# Patient Record
(demographics unavailable — no encounter records)

---

## 2025-01-18 NOTE — HISTORY OF PRESENT ILLNESS
[FreeTextEntry1] : itchy rash on hands for years. Minimal response to previous treatments. [de-identified] : Father and grandparents are patients. Used clobetasol ointment and tacrolimus in past with very little change. Washes and disinfects hands often at work. 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

## 2025-01-18 NOTE — ASSESSMENT
[FreeTextEntry1] : Alert, oriented, well, pleasant.  Thick fissured plaques fingers hands toes feet. No itching or rash elesewhere.  Dyshidrotic dermatitis Dry skin care discussed at length including reduce showers, colder water, Dove Sensitive Skin bar soap, and moisturize daily or more often with cream moisturizers. Try Cetaphil, Eucerin, Aveeno, Cerave. Try vanicream, Theraplex barrier balm during daytime. Moisturize after every hand wash or disinfection. Written instructions.  start Claritin daily. 2 months plus. may take Benadryl 1 or 2 at bedtime for itch. Side effects discussed with patient.  start halobetasol ointment twice per day, end of workday and at bedtime with cotton gloves.  f/u 6 weeks. Intralesional and systemic treatments mentioned. No previous history.

## 2025-03-01 NOTE — ASSESSMENT
[FreeTextEntry1] : Alert, oriented, well, pleasant.  decreased erythema and inflammation. Still dry and scaly. Thin fissure palm. Feet almost resolved. Dyshidrotic dermatitis significant improvement 6 weeks. Continue clobetasol ointment once or twice per day just to itchy or red areas of feet and hands. Side effects discussed with patient.  Continue Claritin daily for 2 months plus. Moisturize daily at work ("every 20 minutes")  f/u prn.

## 2025-03-01 NOTE — HISTORY OF PRESENT ILLNESS
[FreeTextEntry1] : f/u hand and foot eczema. "A little better." [de-identified] : Compliant. Denies side effects.

## 2025-07-07 NOTE — ASSESSMENT
[FreeTextEntry1] : # HM f/u AV labs Due for Pap smear, should see GYN  # Anxiety Stable on therapy, not on meds f/u TFTs  # Elevated BP reading, obesity Discussed diet and exercise in order to lose weight. Increase raw fruits and vegetables, decrease processed foods, fatty foods, sugars. Recommended patient eliminate soda from diet entirely. Duration of discussion 15 min. f/u A1c, lipids  # Eczema Managing with topical steroid  f/u in 1 year or PRN   Visit conducted as part of ongoing, longitudinal medical care for patient's medical and other issues.

## 2025-07-07 NOTE — HISTORY OF PRESENT ILLNESS
[FreeTextEntry1] : est care, CPE [de-identified] : Pt comes in to est care and get CPE.  Eczema: using topical steroids PRN, mostly on her hands/feet  Anxiety: following w/ therapist every other week. Never on meds for this.  Obesity: pt is worried about developing diabetes, her father was just diagnosed. She doesn't have a healthy diet. 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 Was able to stop drinking soda for 2 years in past, then after death of her grandfather she started eating poorly and drinking sodas again.

## 2025-07-07 NOTE — HISTORY OF PRESENT ILLNESS
[FreeTextEntry1] : est care, CPE [de-identified] : Pt comes in to est care and get CPE.  Eczema: using topical steroids PRN, mostly on her hands/feet  Anxiety: following w/ therapist every other week. Never on meds for this.  Obesity: pt is worried about developing diabetes, her father was just diagnosed. She doesn't have a healthy diet. 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 Was able to stop drinking soda for 2 years in past, then after death of her grandfather she started eating poorly and drinking sodas again.

## 2025-07-07 NOTE — HEALTH RISK ASSESSMENT
[Yes] : Yes [Monthly or less (1 pt)] : Monthly or less (1 point) [1 or 2 (0 pts)] : 1 or 2 (0 points) [Never (0 pts)] : Never (0 points) [No] : In the past 12 months have you used drugs other than those required for medical reasons? No [0] : 2) Feeling down, depressed, or hopeless: Not at all (0) [PHQ-2 Negative - No further assessment needed] : PHQ-2 Negative - No further assessment needed [Patient reported PAP Smear was normal] : Patient reported PAP Smear was normal [HIV test declined] : HIV test declined [Hepatitis C test declined] : Hepatitis C test declined [Never] : Never [Audit-CScore] : 1 [de-identified] : nothing regular, trying to get back to going to gym [de-identified] : recently eating out frequently. Probably not enough fruits/vegetables. Drinks sodas daily [[id number]] : 0 [PapSmearDate] : 07/24

## 2025-07-07 NOTE — HEALTH RISK ASSESSMENT
[Yes] : Yes [Monthly or less (1 pt)] : Monthly or less (1 point) [1 or 2 (0 pts)] : 1 or 2 (0 points) [Never (0 pts)] : Never (0 points) [No] : In the past 12 months have you used drugs other than those required for medical reasons? No [0] : 2) Feeling down, depressed, or hopeless: Not at all (0) [PHQ-2 Negative - No further assessment needed] : PHQ-2 Negative - No further assessment needed [Patient reported PAP Smear was normal] : Patient reported PAP Smear was normal [HIV test declined] : HIV test declined [Hepatitis C test declined] : Hepatitis C test declined [Never] : Never [Audit-CScore] : 1 [de-identified] : nothing regular, trying to get back to going to gym [de-identified] : recently eating out frequently. Probably not enough fruits/vegetables. Drinks sodas daily [[id number]] : 0 [PapSmearDate] : 07/24